# Patient Record
Sex: MALE | Race: OTHER | HISPANIC OR LATINO | URBAN - METROPOLITAN AREA
[De-identification: names, ages, dates, MRNs, and addresses within clinical notes are randomized per-mention and may not be internally consistent; named-entity substitution may affect disease eponyms.]

---

## 2021-06-17 ENCOUNTER — EMERGENCY (EMERGENCY)
Facility: HOSPITAL | Age: 41
LOS: 1 days | Discharge: HOME | End: 2021-06-17
Attending: STUDENT IN AN ORGANIZED HEALTH CARE EDUCATION/TRAINING PROGRAM
Payer: COMMERCIAL

## 2021-06-17 VITALS
RESPIRATION RATE: 18 BRPM | OXYGEN SATURATION: 98 % | HEART RATE: 72 BPM | TEMPERATURE: 98 F | DIASTOLIC BLOOD PRESSURE: 70 MMHG | SYSTOLIC BLOOD PRESSURE: 124 MMHG

## 2021-06-17 LAB
ALBUMIN SERPL ELPH-MCNC: 3.3 G/DL — LOW (ref 3.5–5)
ALP SERPL-CCNC: 91 U/L — SIGNIFICANT CHANGE UP (ref 40–120)
ALT FLD-CCNC: 29 U/L DA — SIGNIFICANT CHANGE UP (ref 10–60)
ANION GAP SERPL CALC-SCNC: 7 MMOL/L — SIGNIFICANT CHANGE UP (ref 5–17)
AST SERPL-CCNC: 23 U/L — SIGNIFICANT CHANGE UP (ref 10–40)
BASOPHILS # BLD AUTO: 0.03 K/UL — SIGNIFICANT CHANGE UP (ref 0–0.2)
BASOPHILS NFR BLD AUTO: 0.5 % — SIGNIFICANT CHANGE UP (ref 0–2)
BILIRUB SERPL-MCNC: 0.6 MG/DL — SIGNIFICANT CHANGE UP (ref 0.2–1.2)
BUN SERPL-MCNC: 16 MG/DL — SIGNIFICANT CHANGE UP (ref 7–18)
CALCIUM SERPL-MCNC: 8.4 MG/DL — SIGNIFICANT CHANGE UP (ref 8.4–10.5)
CHLORIDE SERPL-SCNC: 108 MMOL/L — SIGNIFICANT CHANGE UP (ref 96–108)
CO2 SERPL-SCNC: 24 MMOL/L — SIGNIFICANT CHANGE UP (ref 22–31)
CREAT SERPL-MCNC: 0.99 MG/DL — SIGNIFICANT CHANGE UP (ref 0.5–1.3)
D DIMER BLD IA.RAPID-MCNC: 152 NG/ML DDU — SIGNIFICANT CHANGE UP
EOSINOPHIL # BLD AUTO: 0.26 K/UL — SIGNIFICANT CHANGE UP (ref 0–0.5)
EOSINOPHIL NFR BLD AUTO: 4.5 % — SIGNIFICANT CHANGE UP (ref 0–6)
ERYTHROCYTE [SEDIMENTATION RATE] IN BLOOD: 25 MM/HR — HIGH (ref 0–15)
GLUCOSE SERPL-MCNC: 91 MG/DL — SIGNIFICANT CHANGE UP (ref 70–99)
HCT VFR BLD CALC: 42.5 % — SIGNIFICANT CHANGE UP (ref 39–50)
HGB BLD-MCNC: 14.2 G/DL — SIGNIFICANT CHANGE UP (ref 13–17)
IMM GRANULOCYTES NFR BLD AUTO: 0.3 % — SIGNIFICANT CHANGE UP (ref 0–1.5)
LYMPHOCYTES # BLD AUTO: 1.35 K/UL — SIGNIFICANT CHANGE UP (ref 1–3.3)
LYMPHOCYTES # BLD AUTO: 23.4 % — SIGNIFICANT CHANGE UP (ref 13–44)
MCHC RBC-ENTMCNC: 28.6 PG — SIGNIFICANT CHANGE UP (ref 27–34)
MCHC RBC-ENTMCNC: 33.4 GM/DL — SIGNIFICANT CHANGE UP (ref 32–36)
MCV RBC AUTO: 85.5 FL — SIGNIFICANT CHANGE UP (ref 80–100)
MONOCYTES # BLD AUTO: 0.59 K/UL — SIGNIFICANT CHANGE UP (ref 0–0.9)
MONOCYTES NFR BLD AUTO: 10.2 % — SIGNIFICANT CHANGE UP (ref 2–14)
NEUTROPHILS # BLD AUTO: 3.51 K/UL — SIGNIFICANT CHANGE UP (ref 1.8–7.4)
NEUTROPHILS NFR BLD AUTO: 61.1 % — SIGNIFICANT CHANGE UP (ref 43–77)
NRBC # BLD: 0 /100 WBCS — SIGNIFICANT CHANGE UP (ref 0–0)
PLATELET # BLD AUTO: 202 K/UL — SIGNIFICANT CHANGE UP (ref 150–400)
POTASSIUM SERPL-MCNC: 3.8 MMOL/L — SIGNIFICANT CHANGE UP (ref 3.5–5.3)
POTASSIUM SERPL-SCNC: 3.8 MMOL/L — SIGNIFICANT CHANGE UP (ref 3.5–5.3)
PROT SERPL-MCNC: 8.1 G/DL — SIGNIFICANT CHANGE UP (ref 6–8.3)
RBC # BLD: 4.97 M/UL — SIGNIFICANT CHANGE UP (ref 4.2–5.8)
RBC # FLD: 15.1 % — HIGH (ref 10.3–14.5)
SODIUM SERPL-SCNC: 139 MMOL/L — SIGNIFICANT CHANGE UP (ref 135–145)
WBC # BLD: 5.76 K/UL — SIGNIFICANT CHANGE UP (ref 3.8–10.5)
WBC # FLD AUTO: 5.76 K/UL — SIGNIFICANT CHANGE UP (ref 3.8–10.5)

## 2021-06-17 PROCEDURE — 85025 COMPLETE CBC W/AUTO DIFF WBC: CPT

## 2021-06-17 PROCEDURE — 99284 EMERGENCY DEPT VISIT MOD MDM: CPT

## 2021-06-17 PROCEDURE — 86140 C-REACTIVE PROTEIN: CPT

## 2021-06-17 PROCEDURE — 36415 COLL VENOUS BLD VENIPUNCTURE: CPT

## 2021-06-17 PROCEDURE — 85652 RBC SED RATE AUTOMATED: CPT

## 2021-06-17 PROCEDURE — 73590 X-RAY EXAM OF LOWER LEG: CPT | Mod: 26,LT

## 2021-06-17 PROCEDURE — 73590 X-RAY EXAM OF LOWER LEG: CPT

## 2021-06-17 PROCEDURE — 73610 X-RAY EXAM OF ANKLE: CPT | Mod: 26,LT

## 2021-06-17 PROCEDURE — 82962 GLUCOSE BLOOD TEST: CPT

## 2021-06-17 PROCEDURE — 80053 COMPREHEN METABOLIC PANEL: CPT

## 2021-06-17 PROCEDURE — 73610 X-RAY EXAM OF ANKLE: CPT

## 2021-06-17 PROCEDURE — 85379 FIBRIN DEGRADATION QUANT: CPT

## 2021-06-17 PROCEDURE — 99284 EMERGENCY DEPT VISIT MOD MDM: CPT | Mod: 25

## 2021-06-17 RX ORDER — AZTREONAM 2 G
1 VIAL (EA) INJECTION
Qty: 14 | Refills: 0
Start: 2021-06-17 | End: 2021-06-23

## 2021-06-17 NOTE — ED PROVIDER NOTE - CLINICAL SUMMARY MEDICAL DECISION MAKING FREE TEXT BOX
41M no significant PMHx, presenting with left lower leg pain and swelling x 2 weeks. Considering DVT vs cellulitis. No ecchymosis, no bony tenderness therefore unlikely fracture dislocation. Labs and imaging.

## 2021-06-17 NOTE — ED PROVIDER NOTE - NSFOLLOWUPCLINICS_GEN_ALL_ED_FT
[facility]  Orthopedics  [address] [place] [address]  Phone: [phone]  Fax: [phone]

## 2021-06-17 NOTE — ED PROVIDER NOTE - MUSCULOSKELETAL, MLM
Minimal circumferential swelling in left lower calf/ankle region, positive TTP, positive warmth, no sign of erythema, no abscess, non-pitting. Right leg normal. Spine appears normal, range of motion is not limited.

## 2021-06-17 NOTE — ED PROVIDER NOTE - PATIENT PORTAL LINK FT
You can access the FollowMyHealth Patient Portal offered by [facility] by registering at the following website: [email or web address]. By joining [employer]s FollowMyHealth portal, you will also be able to view your health information using other applications (apps) compatible with our system.

## 2021-06-17 NOTE — ED PROVIDER NOTE - PROGRESS NOTE DETAILS
labs showing normal wbc, ddimer neg. no dvt at this time. unlikely infection.  hyperdense linear streak appreciated on xray. d/w radiologist on call who stated possible stress, but no fractures/dislocations or osteo.   upon further d/w patient, I suspect that it might be 2/2 his wearing ankle high boots and working as , d/w patient and wife, advised to stop wearing ankle high boots, apply ice and take motrin as well. will given 1 course abx as coverage for possible infectious etiology. discussed low suspicion for infectious etiology, but patien tand wife in agreement that benefits might outweigh risk for short course. will have patient also f/up with ortho. however high suspicion for mechanical etiology givne lab results, imaging findings and lifestyle factors.   stable for dc home.

## 2021-06-17 NOTE — ED PROVIDER NOTE - OBJECTIVE STATEMENT
40 y/o male with no significant PMHx, presenting with left leg swelling and pain x 2 weeks. Patient states for the last 2 weeks noticed pain left ankle/lower calf region, pain is only significant when left ankle region is touched, does not occur when walking and aching. Patient states that he able to walk normally has not had any trauma however when he touches his ankle feels significant pain. Patient states that he went to PMD and was told to come to ER. Patient has not taken any med for pain at home. Patient denies fevers, chills, chest pain, shortness of breath, abdominal pain, nausea, vomiting, weakness, paresthesias, falls, history of gout, recent surgery, travel, history of DVT/PE.

## 2021-06-17 NOTE — ED ADULT NURSE NOTE - OBJECTIVE STATEMENT
walked in with c/o left leg pain and swelling x 2 weeks w/ pain states that he was sent by hisPMD walked in with c/o left leg pain and swelling x 2 weeks w/ pain states that he was sent by hisPMD, denies recent travel

## 2021-06-18 PROBLEM — Z00.00 ENCOUNTER FOR PREVENTIVE HEALTH EXAMINATION: Status: ACTIVE | Noted: 2021-06-18

## 2021-06-18 LAB — CRP SERPL-MCNC: <3 MG/L — SIGNIFICANT CHANGE UP

## 2021-06-23 ENCOUNTER — APPOINTMENT (OUTPATIENT)
Dept: ORTHOPEDIC SURGERY | Facility: CLINIC | Age: 41
End: 2021-06-23
Payer: COMMERCIAL

## 2021-06-23 VITALS
HEIGHT: 71 IN | DIASTOLIC BLOOD PRESSURE: 75 MMHG | WEIGHT: 190 LBS | HEART RATE: 65 BPM | BODY MASS INDEX: 26.6 KG/M2 | SYSTOLIC BLOOD PRESSURE: 117 MMHG

## 2021-06-23 VITALS — TEMPERATURE: 96.8 F

## 2021-06-23 DIAGNOSIS — Z72.3 LACK OF PHYSICAL EXERCISE: ICD-10-CM

## 2021-06-23 DIAGNOSIS — M25.572 PAIN IN LEFT ANKLE AND JOINTS OF LEFT FOOT: ICD-10-CM

## 2021-06-23 DIAGNOSIS — Z78.9 OTHER SPECIFIED HEALTH STATUS: ICD-10-CM

## 2021-06-23 PROCEDURE — 73610 X-RAY EXAM OF ANKLE: CPT | Mod: LT

## 2021-06-23 PROCEDURE — 99072 ADDL SUPL MATRL&STAF TM PHE: CPT

## 2021-06-23 PROCEDURE — 99203 OFFICE O/P NEW LOW 30 MIN: CPT

## 2021-06-23 NOTE — DISCUSSION/SUMMARY
[de-identified] : Bilateral distal tibia pain left greater than right\par \par I discussed my findings on history exam and radiology\par \par I'm not sure of the exact cause of the patient's pain however I believe the patient's pain and swelling is not of orthopedic origin\par \par \par recommend\par \par elevation\par \par compression stocking\par \par ice/heat\par \par recommend fu vascular surgery\par \par FU PRN

## 2021-06-23 NOTE — HISTORY OF PRESENT ILLNESS
[de-identified] : CC left ankle\par \par HPI 40 yo male presents with acute onset of 3 weeks of constant pain in the distal one third tibia Left ankle[without injury]. The pain is same, and rated a 5 out of 10, described as dull aching throbbing, [without radiation]. nothing makes the pain better and touching area makes the pain worse. The patient reports associated symptoms of swelling and skiny skin. The patient - similar pain previously. \par \par The patient has tried the following treatments:\par Activity modification	-\par Ice/Compression  	-\par Braces    		-\par Nsaids    		-\par Physical Therapy  	-\par Cortisone Injection	-\par Arthroscopy		-\par \par \par Review of Systems is positive for the above musculoskeletal symptoms and is otherwise non-contributory for general, constitutional, psychiatric, neurologic, HEENT, cardiac, respiratory, gastrointestinal, reproductive, lymphatic, and dermatologic complaints.\par \par Consult by Scarlet

## 2021-06-23 NOTE — PHYSICAL EXAM
[de-identified] : Physical Examination\par General: well nourished, in no acute distress, alert and oriented to person, place and time\par Psychiatric: normal mood and affect, no abnormal movements or speech patterns\par Eyes: vision intact - glasses\par Throat: no thyromegaly\par Lymph: no enlarged nodes, no lymphedema in extremity\par Respiratory: no wheezing, no shortness of breath with ambulation\par Cardiac: no cardiac leg swelling, 2+ peripheral pulses\par Neurology: normal gross sensation in extremities to light touch\par Abdomen: soft, non-tender, tympanic, no masses\par \par Musculoskeletal Examination\par Ambulation	- antalgic gait, - assistive devices\par \par Ankle			Right			Left\par General\par 	Deformity       	trace pitting edema to mid calf			mild pitting edema to mid calf	\par 	Skin/Edema   	skiny hyperemic skin			skiny hyperemic skin\par 	Erythema       	-			-\par 	Alignment      	neutral			neutral\par Range of Motion\par 	Ankle Dorsiflex	20			20\par 	Ankle Plantarflex	45			45\par 	Inversion        	15			15\par 	Eversion		5			5\par NO pain to weight bearing\par Drawer\par 	ATFL		-			-\par 	CFL	                	-			-\par 	PTFL		-			-\par Palpation\par 	ATFL		-			-\par 	Medial Heel   	-			-\par 	Other		diffusely distal 1/3 tibia			diffusely distal 1/3 tibia\par Strength Examination/Atrophy\par 	EHL 		5+			5+\par 	FHL 		5+			5+\par 	Tibialis Anterior	5+			5+\par 	Achilles/Soleus	5+			5+\par Sensation\par 	Deep Peroneal	normal			normal\par 	Super Peroneal 	normal			normal\par 	Sural 		normal			normal\par 	Posterior Tibial 	normal			normal\par 	Saphenous    	normal			normal\par Pulses\par 	DP   		2+			2+\par \par \par  [de-identified] : 3 views of the affected L ankle, (AP, Oblique and Lateral)\par were ordered, obtained and evaluated by myself today and\par demonstrate:\par [Is] intact Mortise\par [Normal] Talus contour [without] cysts\par - osteophtes\par - Os Trigonum\par - spurring\par + achilles soft tissue calcification

## 2021-06-25 ENCOUNTER — APPOINTMENT (OUTPATIENT)
Dept: ORTHOPEDIC SURGERY | Facility: CLINIC | Age: 41
End: 2021-06-25